# Patient Record
Sex: FEMALE
[De-identification: names, ages, dates, MRNs, and addresses within clinical notes are randomized per-mention and may not be internally consistent; named-entity substitution may affect disease eponyms.]

---

## 2017-05-19 ENCOUNTER — HOSPITAL ENCOUNTER (OUTPATIENT)
Dept: HOSPITAL 42 - ED | Age: 22
Setting detail: OBSERVATION
Discharge: HOME | End: 2017-05-19
Attending: EMERGENCY MEDICINE | Admitting: EMERGENCY MEDICINE
Payer: COMMERCIAL

## 2017-05-19 VITALS — HEART RATE: 66 BPM | DIASTOLIC BLOOD PRESSURE: 57 MMHG | SYSTOLIC BLOOD PRESSURE: 118 MMHG | OXYGEN SATURATION: 100 %

## 2017-05-19 VITALS — RESPIRATION RATE: 18 BRPM | TEMPERATURE: 98.6 F

## 2017-05-19 VITALS — BODY MASS INDEX: 34.9 KG/M2

## 2017-05-19 DIAGNOSIS — O20.0: Primary | ICD-10-CM

## 2017-05-19 DIAGNOSIS — Z3A.13: ICD-10-CM

## 2017-05-19 LAB
ADD MANUAL DIFF?: NO
ALBUMIN/GLOB SERPL: 1.1 {RATIO} (ref 1.1–1.8)
ALP SERPL-CCNC: 55 U/L (ref 38–133)
ALT SERPL-CCNC: 31 U/L (ref 7–56)
APPEARANCE UR: CLEAR
AST SERPL-CCNC: 18 U/L (ref 15–39)
BACTERIA #/AREA URNS HPF: (no result) /[HPF]
BASOPHILS # BLD AUTO: 0.02 K/MM3 (ref 0–2)
BASOPHILS NFR BLD: 0.2 % (ref 0–3)
BILIRUB SERPL-MCNC: 0.5 MG/DL (ref 0.2–1.3)
BILIRUB UR-MCNC: NEGATIVE MG/DL
BUN SERPL-MCNC: 11 MG/DL (ref 7–21)
CALCIUM SERPL-MCNC: 9.2 MG/DL (ref 8.4–10.5)
CHLORIDE SERPL-SCNC: 102 MMOL/L (ref 98–107)
CO2 SERPL-SCNC: 21 MMOL/L (ref 21–33)
COLOR UR: YELLOW
EOSINOPHIL # BLD: 0.1 10*3/UL (ref 0–0.7)
EOSINOPHIL NFR BLD: 0.5 % (ref 1.5–5)
ERYTHROCYTE [DISTWIDTH] IN BLOOD BY AUTOMATED COUNT: 12.3 % (ref 11.5–14.5)
GLOBULIN SER-MCNC: 3.5 GM/DL
GLUCOSE SERPL-MCNC: 78 MG/DL (ref 70–110)
GLUCOSE UR STRIP-MCNC: NEGATIVE MG/DL
GRANULOCYTES # BLD: 6.54 10*3/UL (ref 1.4–6.5)
GRANULOCYTES NFR BLD: 69.9 % (ref 50–68)
HCT VFR BLD CALC: 34.7 % (ref 36–48)
KETONES UR STRIP-MCNC: NEGATIVE MG/DL
LEUKOCYTE ESTERASE UR-ACNC: NEGATIVE LEU/UL
LYMPHOCYTES # BLD: 2.1 10*3/UL (ref 1.2–3.4)
LYMPHOCYTES NFR BLD AUTO: 22.7 % (ref 22–35)
MCH RBC QN AUTO: 31.2 PG (ref 25–35)
MCHC RBC AUTO-ENTMCNC: 35.7 G/DL (ref 31–37)
MCV RBC AUTO: 87.2 FL (ref 80–105)
MONOCYTES # BLD AUTO: 0.6 10*3/UL (ref 0.1–0.6)
MONOCYTES NFR BLD: 6.7 % (ref 1–6)
PH UR STRIP: 6 [PH] (ref 4.7–8)
PLATELET # BLD: 216 10^3/UL (ref 120–450)
PMV BLD AUTO: 11.3 FL (ref 7–11)
POTASSIUM SERPL-SCNC: 4 MMOL/L (ref 3.6–5)
PROT SERPL-MCNC: 7.3 G/DL (ref 5.8–8.3)
PROT UR STRIP-MCNC: (no result) MG/DL
RBC # UR STRIP: (no result) /UL
RBC #/AREA URNS HPF: (no result) /HPF (ref 0–2)
SODIUM SERPL-SCNC: 133 MMOL/L (ref 132–148)
SP GR UR STRIP: >= 1.03 (ref 1–1.03)
UROBILINOGEN UR STRIP-ACNC: 1 E.U./DL
WBC # BLD AUTO: 9.4 10^3/UL (ref 4.5–11)
WBC #/AREA URNS HPF: (no result) /HPF (ref 0–6)

## 2017-05-19 NOTE — US
PROCEDURE:  Fetal ultrasound



HISTORY:

13 wks pregnant, vag bleeding



COMPARISON:

None.



TECHNIQUE:

Standard protocol for this study/examination.



FINDINGS:

Variable presentation.



Circumferential and fundal Placenta.  No evidence of abruption or 

previa



Gestational age derived from LMP 13 weeks 2 days



Gestational age derived from the following biometric parameters  13 

weeks 6 days .



Biparietal diameter 2.4  cm



Head circumference9.1 cm



Abdominal circumference 7.0 cm



Femur length 1.2 cm



Estimated fetal weight 80.1 g



Calculated cardiac rate 149 beats per min. 



Closed cervix measuring 4.7 cm 



Right ovary 3.3 x 3.6 cm. Contiguous simple cysts 1.8 x 1.6 cm and 

2.1 x 3.1 cm 



Left ovary 3.4 x 2.8 cm 



IMPRESSION:

Thirteen weeks 6 days live intrauterine gestation.



CHIDI based on LMP: 11/22/2017



CHIDI based on biometry: 11/18/2017 key

## 2017-05-19 NOTE — ED PDOC
Arrival/HPI





- General


Chief Complaint: Female Genitourinary


Time Seen by Provider: 17 11:47


Historian: Patient





- History of Present Illness


Narrative History of Present Illness (Text): 





17 13:29


Patient reports she is 13 wks pregnant and reports of 1 day history of mild 

lower abdominal crampy pain with vaginal spotting.  Otherwise: (-) N/V, (-) 

fever, (-) urinary symptoms, (-) prior salpingitis, (-) prior ectopic 

pregnancy. Has (+) prenatal care and (+) prior OB ultrasound - 3 days ago, 

unsure of the results.





GYN HISTORY:   4 Para 1 AB 2 








Past Medical History





- Provider Review


Nursing Documentation Reviewed: Yes





- Infectious Disease


Hx of Infectious Diseases: None





- Tetanus Immunization


Tetanus Immunization: Unknown





- Past Medical History


Past Medical History: No Previous





- Cardiac


Hx Cardiac Disorders: No





- Pulmonary


Hx Respiratory Disorders: No





- Neurological


Hx Neurological Disorder: No





- HEENT


Hx HEENT Disorder: No





- Renal


Hx Renal Disorder: No





- Endocrine/Metabolic


Hx Endocrine Disorders: No





- Hematological/Oncological


Hx Blood Disorders: No





- Integumentary


Hx Dermatological Disorder: No





- Musculoskeletal/Rheumatological


Hx Musculoskeletal Disorders: No





- Gastrointestinal


Hx Gastrointestinal Disorders: No





- Genitourinary/Gynecological


Hx Genitourinary Disorders: No





- Psychiatric


Hx Psychophysiologic Disorder: No


Hx Substance Use: No





- Surgical History


Hx Appendectomy: Yes





- Anesthesia


Hx Anesthesia: Yes


Hx Anesthesia Reactions: No


Hx Malignant Hyperthermia: No





Family/Social History





- Physician Review


Nursing Documentation Reviewed: Yes


Family/Social History: No Known Family HX


Smoking Status: Never Smoked


Hx Alcohol Use: Yes


Hx Substance Use: No





Allergies/Home Meds


Allergies/Adverse Reactions: 


Allergies





No Known Allergies Allergy (Verified 17 11:51)


 











Review of Systems





- Review of Systems


Constitutional: Normal.  absent: Fatigue, Weight Change, Fevers


Respiratory: Normal.  absent: SOB, Cough, Sputum, Wheezing


Cardiovascular: Normal.  absent: Chest Pain, Palpitations, Edema


Gastrointestinal: Normal, Abdominal Pain, Nausea (related to pregnancy).  absent

: Stool Changes, Appetite Changes


Genitourinary Female: Normal, Vaginal Bleeding.  absent: Dysuria, Frequency, 

Hematuria


Skin: Normal.  absent: Rash, Pruritis, Skin Lesions





Physical Exam





- Physical Exam


Narrative Physical Exam (Text): 





17 13:31


GENERAL APPEARANCE: Patient is awake, alert, oriented x 3, in no acute 

distress. 


SKIN:  Warm, dry; (-) cyanosis.


EYES:  (-) conjunctival pallor.


ENMT:  Mucous membranes moist.


NECK:  (-) tenderness, (-) stiffness, (-) lymphadenopathy.


CHEST AND RESPIRATORY:  (-) rales, (-) rhonchi, (-) wheezes; breath sounds 

equal bilaterally.


HEART AND CARDIOVASCULAR:  (-) irregularity; (-) murmur, (-) gallop.


ABDOMEN AND GI:  Soft; (-) tenderness.


EXTREMITIES:  (-) deformity.


NEURO AND PSYCH:  Mental status as above; (-) focal findings.


Vital Signs











  Temp Pulse Resp BP Pulse Ox


 


 17 15:36   66  18  118/57 L  100


 


 17 11:51  98.6 F  75  18  116/67  99














Medical Decision Making


ED Course and Treatment: 





17 13:32


22 yo F 13 weeks pregnant, complaining of mild lower abdominal crampy pain with 

vaginal spotting which started today.








Plan:


-- Labs


-- IV fluids


-- Urinalysis


-- Placed in ED observation for further care and treatment


-- Pelvic US  











- Lab Interpretations


I have reviewed the lab results: Yes


Interpretation: All labs normal





- RAD Interpretation


Narrative RAD Interpretations (Text): 





17 15:56


Pelvic US: FINDINGS:


Variable presentation.





Circumferential and fundal Placenta.  No evidence of abruption or previa





Gestational age derived from LMP 13 weeks 2 days





Gestational age derived from the following biometric parameters  13 weeks 6 

days .





Biparietal diameter 2.4  cm





Head circumference9.1 cm





Abdominal circumference 7.0 cm





Femur length 1.2 cm





Estimated fetal weight 80.1 g





Calculated cardiac rate 149 beats per min. 





Closed cervix measuring 4.7 cm 





Right ovary 3.3 x 3.6 cm. Contiguous simple cysts 1.8 x 1.6 cm and 2.1 x 3.1 cm 





Left ovary 3.4 x 2.8 cm 





IMPRESSION:


Thirteen weeks 6 days live intrauterine gestation.





CHIDI based on LMP: 2017





CHIDI based on biometry: 2017 key





ED OBSERVATION


Date of observation admission: 17


Time of observation admission: 12:13





- Observation admission statement


Patient is being placed in observation because:: 





Patient is 13 wks pregnant, abdominal pain and vaginal spotting, to r/o 

threatened . 





- Goals of Observation


Goals of observation are:: 





To monitor patient's signs and symptoms. 





- Progress Note


Progress Note: 





17 14:55


Labs reviewed and are wnl. Patient has returned from US, results pending. On 

exam, patient is laying in bed in no acute distress, has no complaints at this 

time, reports no abdominal pain or vaginal bleeding. Abdomen remains soft and 

non-tender. 





17 16:00 


US results reviewed and is wnl. Patient is 13 weeks and 6 days. Type and screen 

pending. 





17 16:40


Type and screen results reviewed. Patient still with no abdominal pain or 

significant vaginal bleeding at this time. 


Based on history, exam and diagnostic results plan will be for outpatient f/u 

with her OB. 





Patient states she fully agrees with and understands discharge instructions. 

States that she agrees with the plan and disposition. Verbalized and repeated 

discharge instructions and plan. I have given the patient opportunity to ask 

any additional questions. 





Follow up with OB in 1-2 days without fail. Return to the emergency room at any 

time for any new or worsening symptoms.





- PA / NP / Resident Statement


MD/DO has reviewed & agrees with the documentation as recorded.





Disposition/Present on Arrival





- Present on Arrival


Any Indicators Present on Arrival: No


History of DVT/PE: No


History of Uncontrolled Diabetes: No


Urinary Catheter: No


History of Decub. Ulcer: No


History Surgical Site Infection Following: None





- Disposition


Have Diagnosis and Disposition been Completed?: Yes


Diagnosis: 


 Threatened 





Disposition: HOME/ ROUTINE


Disposition Time: 12:13 (Patient placed in ED observation.)


Patient Plan: Discharge


Patient Problems: 


 Current Active Problems











Problem Status Onset


 


Threatened  Acute  











Condition: STABLE

## 2017-09-29 ENCOUNTER — HOSPITAL ENCOUNTER (EMERGENCY)
Dept: HOSPITAL 31 - C.EROB | Age: 22
Discharge: HOME | End: 2017-09-29
Payer: COMMERCIAL

## 2017-09-29 VITALS — HEART RATE: 75 BPM | DIASTOLIC BLOOD PRESSURE: 55 MMHG | SYSTOLIC BLOOD PRESSURE: 120 MMHG

## 2017-09-29 VITALS — BODY MASS INDEX: 34.9 KG/M2

## 2017-09-29 VITALS — TEMPERATURE: 97.1 F | RESPIRATION RATE: 20 BRPM

## 2017-09-29 DIAGNOSIS — O26.853: Primary | ICD-10-CM

## 2017-09-29 DIAGNOSIS — Z3A.32: ICD-10-CM

## 2017-09-29 LAB
ALBUMIN/GLOB SERPL: 1.2 {RATIO} (ref 1–2.1)
ALP SERPL-CCNC: 73 U/L (ref 38–126)
ALT SERPL-CCNC: 24 U/L (ref 9–52)
AST SERPL-CCNC: 14 U/L (ref 14–36)
BASOPHILS # BLD AUTO: 0 K/UL (ref 0–0.2)
BASOPHILS NFR BLD: 0.3 % (ref 0–2)
BILIRUB SERPL-MCNC: 0.4 MG/DL (ref 0.2–1.3)
BILIRUB UR-MCNC: NEGATIVE MG/DL
BUN SERPL-MCNC: 8 MG/DL (ref 7–17)
CALCIUM SERPL-MCNC: 9.2 MG/DL (ref 8.6–10.4)
CHLORIDE SERPL-SCNC: 101 MMOL/L (ref 98–107)
CO2 SERPL-SCNC: 19 MMOL/L (ref 22–30)
EOSINOPHIL # BLD AUTO: 0 K/UL (ref 0–0.7)
EOSINOPHIL NFR BLD: 0.3 % (ref 0–4)
ERYTHROCYTE [DISTWIDTH] IN BLOOD BY AUTOMATED COUNT: 12.4 % (ref 11.5–14.5)
FIBRINOGEN PPP-MCNC: 535 MG/DL (ref 200–400)
GLOBULIN SER-MCNC: 3.1 GM/DL (ref 2.2–3.9)
GLUCOSE SERPL-MCNC: 85 MG/DL (ref 65–105)
GLUCOSE UR STRIP-MCNC: NORMAL MG/DL
HCT VFR BLD CALC: 36 % (ref 34–47)
INR PPP: 0.9
KETONES UR STRIP-MCNC: NEGATIVE MG/DL
LEUKOCYTE ESTERASE UR-ACNC: (no result) LEU/UL
LYMPHOCYTES # BLD AUTO: 1.4 K/UL (ref 1–4.3)
LYMPHOCYTES NFR BLD AUTO: 13 % (ref 20–40)
MCH RBC QN AUTO: 31.5 PG (ref 27–31)
MCHC RBC AUTO-ENTMCNC: 35.1 G/DL (ref 33–37)
MCV RBC AUTO: 89.8 FL (ref 81–99)
MONOCYTES # BLD: 0.8 K/UL (ref 0–0.8)
MONOCYTES NFR BLD: 7.5 % (ref 0–10)
NRBC BLD AUTO-RTO: 0 % (ref 0–2)
PH UR STRIP: 7 [PH] (ref 5–8)
PLATELET # BLD: 190 K/UL (ref 130–400)
PMV BLD AUTO: 10.8 FL (ref 7.2–11.7)
POTASSIUM SERPL-SCNC: 3.8 MMOL/L (ref 3.6–5.2)
PROT SERPL-MCNC: 6.7 G/DL (ref 6.3–8.3)
PROT UR STRIP-MCNC: NEGATIVE MG/DL
RBC # UR STRIP: (no result) /UL
RBC #/AREA URNS HPF: < 1 /HPF (ref 0–3)
SODIUM SERPL-SCNC: 135 MMOL/L (ref 132–148)
SP GR UR STRIP: 1.01 (ref 1–1.03)
UROBILINOGEN UR-MCNC: NORMAL MG/DL (ref 0.2–1)
WBC # BLD AUTO: 10.8 K/UL (ref 4.8–10.8)
WBC #/AREA URNS HPF: 2 /HPF (ref 0–5)

## 2017-09-29 NOTE — US
PROCEDURE:  OB Pelvic Ultrasound



HISTORY:

r/o placental abruption vs. placenta previa



COMPARISON:

None available.



FINDINGS:



UTERUS:

Gestational sac: Single intrauterine gestation.



Fetal Heart rate: 132 bpm.



Fetal age (Ultrasound estimated): 29 weeks 6 days +/- 1 week 0 day



Tanesha-gestational hemorrhage: None.



Date of delivery (Ultrasound estimated) : 12/09/2027



Placenta seen at the anterior fundus. No evidence of placenta previa. 

No ultrasound evidence of placenta abruption. 



The amount of amniotic fluid is adequate. 



The fetus is seen at cephalic presentation at the time of this exam. 



The biophysical profile is 8 of 8. 



CERVIX:

The uterine cervix is closed and measures 3.3 centimeter.



RIGHT OVARY:

Was not visualized .



LEFT OVARY:

Was not visualized.



FREE FLUID:

None.



OTHER FINDINGS:

None. 



IMPRESSION:

Single intrauterine live pregnancy with ultrasound estimated 

gestational age of 29 weeks 6 days +/- 1 week 0 day. Estimated date 

of delivery by ultrasound is 12/09/2017.



No ultrasound evidence of placenta previa or abruption seen.



The placenta seen at the anterior fundus.



Biophysical profile reported to be 8 of 8.

## 2017-09-29 NOTE — OBHP
===========================

Datetime: 2017 08:43

===========================

   

IP Adm Impression:  , intrauterine pregnancy

Admit Comment, IP Provider:  21 y/o  @ 32.2 wks GA c/o of vaginal bleedign at rest x 1 once wi
th passing small clot.dark red. pt deies any additional bleedign but saw some blood after wipting aft
er she was awoken to go to the bathroom.  Pt denies any ctx, lof, discharge, itching but does have so
me lower back discofmort pt denies any duysuria, urgency, frueqency, constipatoin. pt reprots not sex
ually active. Pt reports 3rd epsidoes of faint spotting. Pt had previuos US in which placenta previa 
was rule out.

      

   OB:  X 1 FT uncmpoictaed

   GYN: dneie hx of fibroid, ovarian cyst, STI

   PMH: deies

   PSH: denies

   FHX denies

   SHX: negatieve etoh/tobacco/drugs

   MEDS: PNVS

      

   A/P  @ 32.2 wks with vaginal spotting r/o placental prvia vs aburption vs  labor

   -labs

   -US

   -conto georgiana oand efm

   -mfm

      

   pt seen an examined

   no bleeding;

   labs wnl

   ve long/clsoed/pseoiror

   us wnl no evidce of previa , abluarpitn, ptcl

   stable for d/c 

   f/u MFm outpatietn 

   f/u OBGYN 1 week

   preterml labor and bleeding perucaitn sgiven

Pelvic Type - PN:  Adequate

Extremities - PN:  Normal

Abdomen - PN:  Normal

Back - PN:  Normal

Breast - PN:  Normal

Lungs - PN:  Normal

Heart - PN:  Normal

Thyroid - PN:  Not Done

Neurologic - PN:  Normal

HEENT - PN:  Normal

General - PN:  Normal

FHR - Baseline A Provider:  135

Contraction Comments Provider:  irritablity

Comments, ACOG Physical Exam:  Abd: sofnt, NT/ND, no guarding, no reboudn tendnere, no rigidty

   Back No CVA b/l

   Extenral Genitali: no gross abnormalies

   Vagina: no gross blood, blood dark brown muoucs type discarge

   cervix; closed, long , posterio, non tender

   Uerus; non tender, gravid, no palpble ctx

   Adnexa; liited, non tender

   Anus/perineum : grossly noraml

Gestation - Est Wks by US:  32.2

EGA AdmitDate IP:  32.2

IP Chief Complaint:  Vaginal bleeding

NICHD Variability Prov Fetus A:  Moderate 6-25bpm

NICHD Accel Fetus A IP Provider:  [id number]

FHR Category Provider Fetus A:  Category I

NICHD Decel Fetus A IP Provider:  None

Dilatation, Provider:  0

Effacement, Provider:  0

Station, Provider:  -3

Genitourinary Exam:  Normal

DTRs - PN:  Normal

## 2017-10-30 ENCOUNTER — HOSPITAL ENCOUNTER (EMERGENCY)
Dept: HOSPITAL 31 - C.EROB | Age: 22
Discharge: HOME | End: 2017-10-30
Payer: COMMERCIAL

## 2017-10-30 VITALS — DIASTOLIC BLOOD PRESSURE: 81 MMHG | HEART RATE: 103 BPM | SYSTOLIC BLOOD PRESSURE: 134 MMHG

## 2017-10-30 VITALS — BODY MASS INDEX: 34.9 KG/M2

## 2017-10-30 DIAGNOSIS — Z3A.36: ICD-10-CM

## 2017-10-30 DIAGNOSIS — O26.893: Primary | ICD-10-CM

## 2017-10-30 NOTE — OBHP
===========================

Datetime: 10/30/2017 14:02

===========================

   

IP Adm Impression:  , intrauterine pregnancy

IP Admit Plan:  Discharge home

Admit Comment, IP Provider:  20 yo  at 36 weeks and 5 days with LMP (2/15/17) with DORETHA (), who presents to the [facility] with complaints of " gush of fluid" and lower abdominal pressure that s
tarted at 6:30am. Patient admits to fetal movement but denies vaginal bleeding, contraction. 

      

    issues: Unexplained vaginal bleeding/spotting 

      

   OB Hx:

   G1: Elective  

   G2: Male, 7lbs 7oz, , 

   G3: Elevtive  

   G4: Current 

      

   Gyn Hx: 

   Menarche: 7

   Triad: days/7 

   Hx of ovarian cysts

   Denies Hx of fibroids, STDSs/STIs

      

   PMHx: Denies

   PSHx: Appendectomy

   FHx: Mother ( Asthma, HTN )

   Allergies: NKDA

   Medications: PNV 

      

   Social  Hx: Lives with  and son. . Former smoker. Social ETOH and denies
 illicit drugs

      

   VS:

   BP: 129/70  HR: 78

   PE: See above 

      

   A/P: 20 yo  at 36 weeks and 5 days with complaints of leakage of fluid

   1. Stable, Afebrile 

   2. External fetal monitor and TOCO

   3. Speculum examination: Nitrazine test negative and negative pooling of fluid

   4. R/O active labor

   5. Anticipate discharge home

   6. Plans discussed with attending 

      

   Dr Chakraborty was there and agrees

      

Extremities - PN:  Normal

Abdomen - PN:  Normal

Lungs - PN:  Normal

Heart - PN:  Normal

General - PN:  Normal

FHR - Baseline A Provider:  130

Contraction Comments Provider:  occ

Comments, ACOG Physical Exam:  Gen: NAD, AAOx3

   Cardio: RRR, Normal S1, S2

   Pulm: CTA bilaterally 

   Abdomen: Soft, Gravid 

   Ext: No cyanosis, no clubbing and no edema

   Speculum examination: Nitrazine test negative and negative pooling of fluid 

   Cervical examination: Closed, posterior 

      

   EFM: 145, + accelerations

   TOCO: No contractiosn 

Pool Provider:  Negative

Nitrazine Provider:  Negative

IP Prenatal Hx Assessment:  The Prenatal History has been Reviewed and is Current

EGA AdmitDate IP:  36.5

Vital Signs Provider:  Reviewed; Within Normal Limits

IP Chief Complaint:  Suspected ruptured membranes

NICHD Variability Prov Fetus A:  Moderate 6-25bpm

NICHD Accel Fetus A IP Provider:  15X15

FHR Category Provider Fetus A:  Category I

Dilatation, Provider:  0

Effacement, Provider:  0

Station, Provider:  -3

## 2017-11-12 ENCOUNTER — HOSPITAL ENCOUNTER (INPATIENT)
Dept: HOSPITAL 31 - C.EROB | Age: 22
LOS: 2 days | Discharge: HOME | End: 2017-11-14
Attending: OBSTETRICS & GYNECOLOGY | Admitting: OBSTETRICS & GYNECOLOGY
Payer: COMMERCIAL

## 2017-11-12 VITALS — BODY MASS INDEX: 39.2 KG/M2

## 2017-11-12 DIAGNOSIS — Z3A.38: ICD-10-CM

## 2017-11-12 LAB
ALBUMIN/GLOB SERPL: 1 {RATIO} (ref 1–2.1)
ALP SERPL-CCNC: 97 U/L (ref 38–126)
ALT SERPL-CCNC: 28 U/L (ref 9–52)
AST SERPL-CCNC: 24 U/L (ref 14–36)
BASOPHILS # BLD AUTO: 0.1 K/UL (ref 0–0.2)
BASOPHILS NFR BLD: 0.9 % (ref 0–2)
BILIRUB SERPL-MCNC: 0.5 MG/DL (ref 0.2–1.3)
BILIRUB UR-MCNC: NEGATIVE MG/DL
BUN SERPL-MCNC: 12 MG/DL (ref 7–17)
CALCIUM SERPL-MCNC: 9.3 MG/DL (ref 8.6–10.4)
CHLORIDE SERPL-SCNC: 102 MMOL/L (ref 98–107)
CO2 SERPL-SCNC: 18 MMOL/L (ref 22–30)
EOSINOPHIL # BLD AUTO: 0 K/UL (ref 0–0.7)
EOSINOPHIL NFR BLD: 0.3 % (ref 0–4)
ERYTHROCYTE [DISTWIDTH] IN BLOOD BY AUTOMATED COUNT: 12.3 % (ref 11.5–14.5)
GLOBULIN SER-MCNC: 4.2 GM/DL (ref 2.2–3.9)
GLUCOSE SERPL-MCNC: 69 MG/DL (ref 65–105)
GLUCOSE UR STRIP-MCNC: NORMAL MG/DL
HCT VFR BLD CALC: 39.7 % (ref 34–47)
KETONES UR STRIP-MCNC: NEGATIVE MG/DL
LEUKOCYTE ESTERASE UR-ACNC: (no result) LEU/UL
LYMPHOCYTES # BLD AUTO: 2.1 K/UL (ref 1–4.3)
LYMPHOCYTES NFR BLD AUTO: 14 % (ref 20–40)
MCH RBC QN AUTO: 29.8 PG (ref 27–31)
MCHC RBC AUTO-ENTMCNC: 33.6 G/DL (ref 33–37)
MCV RBC AUTO: 88.6 FL (ref 81–99)
MONOCYTES # BLD: 1 K/UL (ref 0–0.8)
MONOCYTES NFR BLD: 6.9 % (ref 0–10)
NRBC BLD AUTO-RTO: 0 % (ref 0–2)
PH UR STRIP: 6 [PH] (ref 5–8)
PLATELET # BLD: 204 K/UL (ref 130–400)
PMV BLD AUTO: 12 FL (ref 7.2–11.7)
POTASSIUM SERPL-SCNC: 4.3 MMOL/L (ref 3.6–5.2)
PROT SERPL-MCNC: 8.2 G/DL (ref 6.3–8.3)
PROT UR STRIP-MCNC: NEGATIVE MG/DL
RBC # UR STRIP: (no result) /UL
RBC #/AREA URNS HPF: < 1 /HPF (ref 0–3)
SODIUM SERPL-SCNC: 131 MMOL/L (ref 132–148)
SP GR UR STRIP: 1.01 (ref 1–1.03)
UROBILINOGEN UR-MCNC: NORMAL MG/DL (ref 0.2–1)
WBC # BLD AUTO: 15 K/UL (ref 4.8–10.8)
WBC #/AREA URNS HPF: 2 /HPF (ref 0–5)

## 2017-11-12 PROCEDURE — 10907ZC DRAINAGE OF AMNIOTIC FLUID, THERAPEUTIC FROM PRODUCTS OF CONCEPTION, VIA NATURAL OR ARTIFICIAL OPENING: ICD-10-PCS | Performed by: OBSTETRICS & GYNECOLOGY

## 2017-11-12 NOTE — OBHP
===========================

Datetime: 2017 18:52

===========================

   

IP Adm Impression:  Term, intrauterine pregnancy; Active labor

IP Admit Plan:  Admit to unit; Initiate labor protocol

Admit Comment, IP Provider:  22 y.ol , LMP ?, DORETHA 17, EGA 38w 4d c/o Ctx - onset 1400 h
ours, then pain scale 6/10; now 10/10 and occurring every 2-3 minutes.  (+) AFM; denies LOF, VB.  Pre
alvaro provider: Dr. Coty Shipman; unremarkable

      

   P Ob: 2013, , male 7lb 7oz, [facility]; no complications.  VTOP x 2: , , both first 
trimestre, medical; no D_C; no complications

   P GYN: 9 x monthly x 7.  Denies h/o STIs r abnormal Pap.

   PMH:  denies

   PSH:  , age 11, appendectomy

   Meds:  PNV

   Soc Hx:  denies tobacco, illicit drug or etOH use.  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
ker.

   Fam Hx:  Mother alive 54 - asthma, HTN, heart diseases.  Father alive 61 - nomed issues. MGF - unk
 cancer

      

      

   P.E.: as above. Mildy obese, in moderate discomfort.  Awake, alert, oriented to time, person and p
lace. Pleasant and cooperative.

      

      

   Assessment: 22 Y.O. , 38w 4d, near end of stage 1 of labor.  GBS negative. Clinically stable.


      

   Plan:

   1) Admit

   2) NPO

   3) IVFs

   4) continuous EFM

   5) Anticipate vaginal delivery

   - Dr. Shipman aware

Pelvic Type - PN:  Adequate

Extremities - PN:  Normal

Abdomen - PN:  Normal

Back - PN:  Normal

Breast - PN:  Not Done

Lungs - PN:  Normal

Heart - PN:  Normal

Thyroid - PN:  Not Done

Neurologic - PN:  Normal

HEENT - PN:  Normal

General - PN:  Normal

Fetal Presentation-Admit:  Vertex

FHR - Baseline A Provider:  145

Contraction Comments Provider:  2-4

Comments, ACOG Physical Exam:  Abdomen: Gravid. Firm with contractions

   Cervical exam: (+) bulging membranes

      

      

   All other systems reviewed and are negative

Gestation - Est Wks by US:  38w 4d

IP Prenatal Hx Assessment:  The Prenatal History has been Reviewed and is Current

EGA AdmitDate IP:  38.4

Vital Signs Provider:  Reviewed; Within Normal Limits

IP Indication for Induction:  Not Applicable

IP Chief Complaint:  Uterine contractions

NICHD Variability Prov Fetus A:  Moderate 6-25bpm

NICHD Accel Fetus A IP Provider:  [id number]

FHR Category Provider Fetus A:  Category I

NICHD Decel Fetus A IP Provider:  Early

Dilatation, Provider:  9

Effacement, Provider:  90

Station, Provider:  0

Genitourinary Exam:  Normal

DTRs - PN:  Not Done

## 2017-11-12 NOTE — OBADHP
===========================

Datetime: 2017 18:52

===========================

   

Admit Comment, IP Provider:  22 y.ol , LMP ?, DORETHA 17, EGA 38w 4d c/o Ctx - onset 1400 h
ours, then pain scale 6/10; now 10/10 and occurring every 2-3 minutes.  (+) AFM; denies LOF, VB.  Pre
alvaro provider: Dr. Coty Shipman; unremarkable

      

   P Ob: 2013, , male 7lb 7oz, [facility]; no complications.  VTOP x 2: , , both first 
trimestre, medical; no D_C; no complications

   P GYN: 9 x monthly x 7.  Denies h/o STIs r abnormal Pap.

   PMH:  denies

   PSH:  , age 11, appendectomy

   Meds:  PNV

   Soc Hx:  denies tobacco, illicit drug or etOH use.  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
ker.

   Fam Hx:  Mother alive 54 - asthma, HTN, heart diseases.  Father alive 61 - nomed issues. MGF - unk
 cancer

      

      

   P.E.: as above. Mildy obese, in moderate discomfort.  Awake, alert, oriented to time, person and p
lace. Pleasant and cooperative.

      

      

   Assessment: 22 Y.O. , 38w 4d, near end of stage 1 of labor.  GBS negative. Clinically stable.


      

   Plan:

   1) Admit

   2) NPO

   3) IVFs

   4) continuous EFM

   5) Anticipate vaginal delivery

   - Dr. Shipman aware

Pelvic Type - PN:  Adequate

Extremities - PN:  Normal

Abdomen - PN:  Normal

Back - PN:  Normal

Breast - PN:  Not Done

Lungs - PN:  Normal

Heart - PN:  Normal

Thyroid - PN:  Not Done

Neurologic - PN:  Normal

HEENT - PN:  Normal

General - PN:  Normal

Fetal Presentation-Admit:  Vertex

FHR - Baseline A Provider:  145

Contraction Comments Provider:  2-4

Comments, ACOG Physical Exam:  Abdomen: Gravid. Firm with contractions

   Cervical exam: (+) bulging membranes

      

      

   All other systems reviewed and are negative

Gestation - Est Wks by US:  38w 4d

IP Prenatal Hx Assessment:  The Prenatal History has been Reviewed and is Current

Vital Signs Provider:  Reviewed; Within Normal Limits

IP Chief Complaint:  Uterine contractions

NICHD Variability Prov Fetus A:  Moderate 6-25bpm

NICHD Accel Fetus A IP Provider:  15X15

FHR Category Provider Fetus A:  Category I

NICHD Decel Fetus A IP Provider:  Early

Dilatation, Provider:  9

Effacement, Provider:  90

Station, Provider:  0

Genitourinary Exam:  Normal

DTRs - PN:  Not Done

EGA AdmitDate IP:  38.4

IP Adm Impression:  Term, intrauterine pregnancy; Active labor

IP Admit Plan:  Admit to unit; Initiate labor protocol

   

===========================

Datetime: 10/30/2017 14:02

===========================

   

Pool Provider:  Negative

Nitrazine Provider:  Negative

## 2017-11-12 NOTE — OBDS
===================================

DELIVERY PERSONNEL

===================================

   

Delivery Doctor:  SANNA Shipman MD

Scrub Nurse:  NPAMELA

Anesthesiologist:  NPAMELA

Resident:  AQUILINO

   

===================================

MATERNAL INFORMATION

===================================

   

Provider Comments:  pt was fully dilated and pushing, precipious delivery en call over intact pereinu
m. Nuchal adn body cord x 1 reduced. umbilcal cord clamped and cut. baby handed to mother on abodmen 
with rn assistance. cord blood collected x 2. spotnaoeus dlivey rof intact palcent with membrnes. fun
dus firm. good hemostaiss, no lacerations. no cmplication

      

   ebl 200ml

   apgar 9,9

   weight of 5lb 14 ounces

   

===================================

LABOR SUMMARY

===================================

   

EDC:  2017 00:00

No. Babies in Womb:  1

 Attempted:  No

Labor Anesthesia:  None

   

===================================

LABOR INFORMATION

===================================

   

Onset of Labor:  2017 14:00

Complete Dilatation:  2017 19:00

   

===================================

MEMBRANES

===================================

   

Membranes Rupture Method:  Artificial

Rupture of Membranes:  2017 19:01

Length of Rupture (hrs):  0.02

   

===================================

STAGES OF LABOR

===================================

   

Stage 1 hrs:  5

Stage 1 min:  0

Stage 2 hrs:  0

Stage 2 min:  2

Stage 3 hrs:  0

Stage 3 min:  9

Total Time in Labor hrs:  5

Total Time in Labor min:  11

   

===================================

VAGINAL DELIVERY

===================================

   

Episiotomy:  None

Laceration Extension:  N/A

Laceration Type:  None

Laceration Repair:  /A

Laceration Repair Note:  intact

Initial Vag Sponge Count:  1 LAP, 10 X-RAYS

Final Vag Sponge Count:  1 LAP, 1O X-RAYS

Initial Vag Sharps Count:  0

Final Vag Sharps Count:  0

Sponge Count Correct:  N/A

Sharps Count Correct:  NO SHARPS USED

Count Comment:  ALL ACCOUNTED FOR

   

===================================

BABY A INFORMATION

===================================

   

Infant Delivery Date/Time:  2017 19:02

Method of Delivery:  Vaginal

   

===================================

SHOULDER DYSTOCIA BABY A

===================================

   

Infant Delivery Date/Time:  2017 19:02

   

===================================

PRESENTATION/POSITION BABY A

===================================

   

Presentation:  Cephalic

Cephalic Presentation:  Vertex

Breech Presentation:  N/A

   

===================================

PLACENTA INFORMATION BABY A

===================================

   

Placenta Delivery Time :  2017 19:11

   

===================================

APGAR SCORES BABY A

===================================

   

Heart Rate 1 min:  >100 bpm

Resp Effort 1 min:  Good Cry

Reflex Irritability 1 min:  Cough or Sneeze or Pulls Away

Muscle Tone 1 min:  Active Motion

Color 1 min:  Body Pink, Extremities Blue

Resuscitation Effort 1 min:  N/A

APGAR SCORE 1 MIN:  9

Heart Rate 5 min:  >100 bpm

Resp Effort 5 min:  Good Cry

Reflex Irritability 5 min:  Cough or Sneeze or Pulls Away

Muscle Tone 5 min:  Active Motion

Color 5 min:  Body Pink, Extremities Blue

Resuscitation Effort 5 min:  N/A

APGAR SCORE 5 MIN:  9

   

===================================

INFANT INFORMATION BABY A

===================================

   

Gestational Age at Delivery:  38.4

Gestational Status:  Term

Infant Outcome :  Liveborn

Infant Condition :  Stable

Infant Sex:  Male

   

===================================

IDENTIFICATION/MEDS BABY A

===================================

   

ID Band Number:  [id number]

ID Band Location:  Left Leg; Left Arm

Sensor Applied:  Yes

Sensor Number:  [id number]

Sensor Location :  Cord Clamp

Vitamin K Given :  Not Given

Erythromycin Given:  Not Given

   

===================================

WEIGHT/LENGTH BABY A

===================================

   

Infant Birthweight (gms):  2670

Infant Weight (lb):  5

Infant Weight (oz):  14

Infant Length Inches:  18.75

Infant Length cms:  47.6

   

===================================

CORD INFORMATION BABY A

===================================

   

No. Cord Vessels:  #3

Nuchal Cord :  X1 AROUND NECK, X1 BODY

Cord Blood Taken:  Yes

Infant Suction:  None

   

===================================

ASSESSMENT BABY A

===================================

   

Infant Complications:  None

Physical Findings at Delivery:  Within Normal Limits

Infant Respirations:  Appears Normal

Neonatologist/ALS Called :  No

Transferred To:   Nursery

## 2017-11-13 LAB
BASOPHILS # BLD AUTO: 0 K/UL (ref 0–0.2)
BASOPHILS NFR BLD: 0.4 % (ref 0–2)
EOSINOPHIL # BLD AUTO: 0 K/UL (ref 0–0.7)
EOSINOPHIL NFR BLD: 0.4 % (ref 0–4)
ERYTHROCYTE [DISTWIDTH] IN BLOOD BY AUTOMATED COUNT: 12.7 % (ref 11.5–14.5)
HCT VFR BLD CALC: 37.7 % (ref 34–47)
LYMPHOCYTES # BLD AUTO: 1.8 K/UL (ref 1–4.3)
LYMPHOCYTES NFR BLD AUTO: 14.3 % (ref 20–40)
MCH RBC QN AUTO: 30.3 PG (ref 27–31)
MCHC RBC AUTO-ENTMCNC: 34 G/DL (ref 33–37)
MCV RBC AUTO: 88.9 FL (ref 81–99)
MONOCYTES # BLD: 0.9 K/UL (ref 0–0.8)
MONOCYTES NFR BLD: 7.4 % (ref 0–10)
NRBC BLD AUTO-RTO: 0 % (ref 0–2)
PLATELET # BLD: 167 K/UL (ref 130–400)
PMV BLD AUTO: 11.9 FL (ref 7.2–11.7)
WBC # BLD AUTO: 12.5 K/UL (ref 4.8–10.8)

## 2017-11-13 RX ADMIN — OXYCODONE HYDROCHLORIDE AND ACETAMINOPHEN PRN TAB: 5; 325 TABLET ORAL at 19:20

## 2017-11-13 RX ADMIN — Medication SCH TAB: at 10:45

## 2017-11-13 NOTE — OBPPN
===========================

Datetime: 2017 07:00

===========================

   

PP Pain Prov:  Within normal limits

PP Nausea Prov:  Denies

PP Flatus Prov:  Yes

PP Heart Prov:  Normal

PP Lungs Prov:  Normal

PP Abdomen/Uterus Prov:  Normal

PP Lochia Prov:  Normal

PP Extremities Prov:  Normal

PP Breastfeeding Progress Prov:  Normal

PP Comments Phys Exam Prov:  Abdomen: Soft, non-tender, fundus is firm and slightly above the umbilic
us 

PP Impression Prov:  Normal postpartum progression

PP Plan Prov:  Continue present management

PP Progress Note Prov:  Patient was seen and examined at bedside. Patient reports that she is doing w
ell and has no complaints. Patient is tolerating diet and ambulating. Patient admits to passing flatu
s, urinating without difficulty and mild lochia. Patient denies nausea, fever, chills, BM, nausea, vo
miting, calf tenderness. 

      

   VS: F/U am vitals 

   VSS

      

   Physical Examination:

   Gen: AAOx3, NAD

   Cardio: RRR, Normal S1, S2

   Pulm: CTA bilaterally 

   Abdomen: Soft, non-tender, fundus is firm and slightly above the umbilicus 

   Breast: Non-tender, engorged 

   Ext: No edema, clubbing and no cyanosis

      

   Labs: 15.0>13.4/39.7<204, f/u am CBC 

   O positive, rubella immune 

      

   A/P: 23 yo  at 38 weeks and 4 days, who is now , S/P , PPD#1

   1. Stable

   2. Pain is well-controlled: Motrin and Percocet 

   3. F/u am CBC 

   4. Continue hydration and ambulation 

   5. Continue breast feeding 

   6. Male : Desires circumcision 

   7. Anticipate discharge tomorrow 

   8. Plans discussed with attending 

      

   Nate Hernández DO, PGY-1 

   agree wth above

## 2017-11-14 VITALS
OXYGEN SATURATION: 98 % | HEART RATE: 79 BPM | TEMPERATURE: 97.1 F | RESPIRATION RATE: 18 BRPM | DIASTOLIC BLOOD PRESSURE: 68 MMHG | SYSTOLIC BLOOD PRESSURE: 115 MMHG

## 2017-11-14 RX ADMIN — Medication SCH TAB: at 09:32

## 2017-11-14 RX ADMIN — OXYCODONE HYDROCHLORIDE AND ACETAMINOPHEN PRN TAB: 5; 325 TABLET ORAL at 06:02

## 2017-11-15 NOTE — OBPPN
===========================

Datetime: 2017 07:15

===========================

   

PP Pain Prov:  Within normal limits

PP Nausea Prov:  Present

PP Flatus Prov:  Yes

PP BM Prov:  Yes

PP Heart Prov:  Normal

PP Lungs Prov:  Normal

PP Abdomen/Uterus Prov:  Normal

PP Lochia Prov:  Normal

PP Extremities Prov:  Normal

PP Breastfeeding Progress Prov:  Normal

PP Comments Phys Exam Prov:  Abdomen: Soft, non-tender, fundus is firm and below the umbilicus 

PP Impression Prov:  Normal postpartum progression

PP Plan Prov:  Discharge

PP Progress Note Prov:  Patient was seen and examined at bedside. Patient reports that she is doing w
ell and has no complaints. Patient is tolerating diet and ambulating. Patient admits to passing flatu
s, urinating without difficulty, BM, light lochia. Patient denies nausea, fever, chills, nausea, vomi
ting, calf tenderness. Patient is breast feeding 

      

   VS: 

   BP:131/83, temp: 98.7, HR:76, RR:20

      

   Physical Examination:

   Gen: AAOx3, NAD

   Cardio: RRR, Normal S1, S2

   Pulm: CTA bilaterally 

   Abdomen: Soft, non-tender, fundus is firm and slightly above the umbilicus 

   Breast: Non-tender, engorged 

   Ext: No edema, clubbing and no cyanosis

      

   Labs: 15.0>13.4/39.7<204, 12.5>12.8/37.7<167

   O positive, rubella immune 

      

   A/P: 21 yo  at 38 weeks and 4 days, who is now , S/P , PPD#2

   1. Stable, Afebrile 

   2. Pain is well-controlled

   3. Continue hydration and ambulation 

   4. Continue breast feeding 

   5. Discharge today: Pelvic rest and nothing per vagina for 6 weeks, OTC motrin and tylenol for daniel
n control, f/u in the clinic in 6 weeks, continue ambulation and hydration 

   6. Anticipate discharge tomorrow 

   7. Plans discussed with attending 

      

   Nate Hernández DO, PGY-1 

      

   dealye entry 

   p[t seen adn examiend with resident

Vital Signs Provider PP:  Reviewed; Within Normal Limits

Vital Signs Provider Details PP:  Nausea/vomiting due to percocet

## 2018-06-10 ENCOUNTER — HOSPITAL ENCOUNTER (EMERGENCY)
Dept: HOSPITAL 42 - ED | Age: 23
Discharge: HOME | End: 2018-06-10
Payer: COMMERCIAL

## 2018-06-10 VITALS — OXYGEN SATURATION: 99 % | TEMPERATURE: 98 F

## 2018-06-10 VITALS — BODY MASS INDEX: 29.2 KG/M2

## 2018-06-10 VITALS — DIASTOLIC BLOOD PRESSURE: 60 MMHG | HEART RATE: 63 BPM | SYSTOLIC BLOOD PRESSURE: 123 MMHG

## 2018-06-10 VITALS — RESPIRATION RATE: 18 BRPM
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
## 2018-06-10 DIAGNOSIS — T15.02XA: Primary | ICD-10-CM

## 2018-06-10 NOTE — ED PDOC
Arrival/HPI





- General


Chief Complaint: Eye Problem


Time Seen by Provider: 06/10/18 02:20


Historian: Patient





- History of Present Illness


Narrative History of Present Illness (Text): 





06/10/18 02:44





A 22 year old female, with no significant past medical history, presents to the 

emergency department complaining of left eye irritation due to metal being 

stuck in her eye. 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 

weeks ago, she had a piece of metal that landed in her left eye at work. She 

states that she was instructed to flush out her eye by a medical technician at 

her job. However, she still felt the discomfort since then. She states that 

today when she looked in the mirror, she noticed the piece of metal in her left 

eye. The patient also notes that since then, she has been experiencing more 

frequent headaches. The patient denies fevers, chills, dizziness, vision changes

, chest pain, neck pain, back pain, abdominal pain, nausea, vomiting, diarrhea, 

or any other complaints.











Time/Duration: Other (Today)


Symptom Onset: Sudden


Symptom Course: Unchanged


Activities at Onset: Rest, Light


Context: Home





Past Medical History





- Provider Review


Nursing Documentation Reviewed: Yes





- Infectious Disease


Hx of Infectious Diseases: None





- Tetanus Immunization


Tetanus Immunization: Unknown





- Past Medical History


Past Medical History: No Previous





- Cardiac


Hx Cardiac Disorders: No


Hx Hypertension: No





- Pulmonary


Hx Respiratory Disorders: No





- Neurological


Hx Neurological Disorder: No





- HEENT


Hx HEENT Disorder: No





- Renal


Hx Renal Disorder: No





- Endocrine/Metabolic


Hx Endocrine Disorders: No





- Hematological/Oncological


Hx Blood Disorders: No





- Integumentary


Hx Dermatological Disorder: No





- Musculoskeletal/Rheumatological


Hx Musculoskeletal Disorders: No





- Gastrointestinal


Hx Gastrointestinal Disorders: No





- Genitourinary/Gynecological


Hx Genitourinary Disorders: No





- Psychiatric


Hx Depression: No


Hx Substance Use: No





- Surgical History


Hx Appendectomy: Yes





- Anesthesia


Hx Anesthesia: Yes


Hx Anesthesia Reactions: No


Hx Malignant Hyperthermia: No





Family/Social History





- Physician Review


Nursing Documentation Reviewed: Yes


Family/Social History: No Known Family HX


Smoking Status: Never Smoked


Hx Alcohol Use: Yes


Hx Substance Use: No





Allergies/Home Meds


Allergies/Adverse Reactions: 


Allergies





No Known Allergies Allergy (Verified 06/10/18 02:12)


 











Review of Systems





- Physician Review


All systems were reviewed & negative as marked: Yes





- Review of Systems


Constitutional: absent: Fevers, Night Sweats


Eyes: Other (Metal in left eye. Irritation. ).  absent: Vision Changes


Respiratory: absent: SOB


Cardiovascular: absent: Chest Pain


Gastrointestinal: absent: Abdominal Pain, Diarrhea, Nausea, Vomiting


Musculoskeletal: absent: Back Pain, Neck Pain


Neurological: Headache.  absent: Dizziness





Physical Exam


Vital Signs Reviewed: Yes


Vital Signs











  Temp Pulse Resp BP Pulse Ox


 


 06/10/18 05:00  98 F  63  18  123/60  99


 


 06/10/18 04:34  98 F  62  18  121/62  99


 


 06/10/18 02:23  98.0 F  60  18  122/63  98











Temperature: Afebrile


Blood Pressure: Normal


Pulse: Regular


Respiratory Rate: Normal


Appearance: Positive for: Well-Appearing, Non-Toxic, Comfortable


Pain Distress: None


Mental Status: Positive for: Alert and Oriented X 3





- Systems Exam


Head: Present: Atraumatic, Normocephalic


Pupils: Present: PERRL


Extroacular Muscles: Present: EOMI


Conjunctiva: Present: Normal


Mouth: Present: Moist Mucous Membranes


Neck: Present: Normal Range of Motion


Respiratory/Chest: Present: Clear to Auscultation, Good Air Exchange.  No: 

Respiratory Distress, Accessory Muscle Use


Cardiovascular: Present: Regular Rate and Rhythm, Normal S1, S2.  No: Murmurs


Abdomen: No: Tenderness, Distention, Peritoneal Signs


Back: Present: Normal Inspection


Upper Extremity: Present: Normal Inspection.  No: Cyanosis, Edema


Lower Extremity: Present: Normal Inspection.  No: Edema


Neurological: Present: GCS=15, CN II-XII Intact, Speech Normal


Skin: Present: Warm, Dry, Normal Color.  No: Rashes


Psychiatric: Present: Alert, Oriented x 3, Normal Insight, Normal Concentration





Medical Decision Making


ED Course and Treatment: 





06/10/18 02:51





Impression:


A 22 year old female presents to the emergency department  for a complaint of 

left eye pain due to a piece of metal that has been in her eye for the past 2 

weeks. 





Plan:


-- Reassess and disposition





Progress Notes:





06/10/18 06:33


unable to retrieve foreign body in eye, at 2 o clock position. attempted moiist 

guaze and 26 g needle with slit lamp. refuses for me tod iscuss with optho in er

,   states she will follow up outpt. with optho





- Medication Orders


Current Medication Orders: 











Discontinued Medications





Tetracaine HCl (Tetracaine 0.5% Ophth Soln)  1 drop OS STAT STA


   Stop: 06/10/18 02:41


   Last Admin: 06/10/18 02:56  Dose: 1 drop











- Scribe Statement


The provider has reviewed the documentation as recorded by the Scribe





Deepa Thapa





Provider Scribe Attestation:


All medical record entries made by the Scribe were at my direction and 

personally dictated by me. I have reviewed the chart and agree that the record 

accurately reflects my personal performance of the history, physical exam, 

medical decision making, and the department course for this patient. I have 

also personally directed, reviewed, and agree with the discharge instructions 

and disposition.








Disposition/Present on Arrival





- Present on Arrival


Any Indicators Present on Arrival: No


History of DVT/PE: No


History of Uncontrolled Diabetes: No


Urinary Catheter: No


History of Decub. Ulcer: No


History Surgical Site Infection Following: None





- Disposition


Have Diagnosis and Disposition been Completed?: Yes


Diagnosis: 


 Corneal foreign body





Disposition: HOME/ ROUTINE


Disposition Time: 05:00


Condition: STABLE


Discharge Instructions (ExitCare):  Foreign Body in Eye


Print Language: ENGLISH


Additional Instructions: 


please follow up with optho. return to er with worsening symptoms or concerns. 


Prescriptions: 


Polymyxin/Trimethoprim Sulfate [Polytrim Ophth Soln] 1 drop LEFTEYE Q4 #1 bottle


Referrals: 


Stepan Murray MD [Staff Provider] - Follow up with primary


Forms:  [facility] (English)